# Patient Record
Sex: FEMALE | Race: WHITE | Employment: OTHER
[De-identification: names, ages, dates, MRNs, and addresses within clinical notes are randomized per-mention and may not be internally consistent; named-entity substitution may affect disease eponyms.]

---

## 2022-03-19 PROBLEM — K62.5 RECTAL BLEEDING: Status: ACTIVE | Noted: 2018-01-13

## 2022-03-19 PROBLEM — K52.9 COLITIS: Status: ACTIVE | Noted: 2018-01-13

## 2022-03-20 PROBLEM — N13.9 OBSTRUCTIVE UROPATHY: Status: ACTIVE | Noted: 2020-03-17

## 2023-02-01 RX ORDER — CALCIUM CARBONATE/VITAMIN D3 600 MG-10
1 TABLET ORAL DAILY
COMMUNITY

## 2023-02-01 RX ORDER — BACLOFEN 10 MG/1
10 TABLET ORAL 3 TIMES DAILY
COMMUNITY

## 2023-02-01 RX ORDER — BUPROPION HYDROCHLORIDE 300 MG/1
300 TABLET ORAL
COMMUNITY

## 2023-02-01 RX ORDER — MODAFINIL 200 MG/1
TABLET ORAL
COMMUNITY
Start: 2021-06-25

## 2023-02-01 RX ORDER — GABAPENTIN 300 MG/1
CAPSULE ORAL
COMMUNITY
Start: 2021-05-18

## 2023-02-01 RX ORDER — AZELASTINE HCL 205.5 UG/1
2 SPRAY NASAL 2 TIMES DAILY
COMMUNITY

## 2023-02-01 RX ORDER — ALBUTEROL SULFATE 90 UG/1
2 AEROSOL, METERED RESPIRATORY (INHALATION) EVERY 6 HOURS PRN
COMMUNITY

## 2023-02-01 RX ORDER — GABAPENTIN 800 MG/1
TABLET ORAL
COMMUNITY
Start: 2021-05-18

## 2023-02-01 RX ORDER — TRAZODONE HYDROCHLORIDE 100 MG/1
100 TABLET ORAL
COMMUNITY

## 2023-02-01 RX ORDER — FLUTICASONE PROPIONATE 50 MCG
2 SPRAY, SUSPENSION (ML) NASAL DAILY
COMMUNITY

## 2023-02-01 RX ORDER — DICYCLOMINE HCL 20 MG
20 TABLET ORAL 3 TIMES DAILY
COMMUNITY

## 2023-02-01 RX ORDER — BUTALBITAL, ACETAMINOPHEN AND CAFFEINE 50; 325; 40 MG/1; MG/1; MG/1
1 TABLET ORAL
COMMUNITY

## 2023-02-01 RX ORDER — TOPIRAMATE 100 MG/1
300 TABLET, FILM COATED ORAL
COMMUNITY

## 2023-05-07 PROBLEM — K52.9 ACUTE COLITIS: Status: ACTIVE | Noted: 2023-05-07

## 2023-05-11 PROBLEM — I95.9 HYPOTENSION: Status: ACTIVE | Noted: 2023-05-11

## 2023-05-11 PROBLEM — N13.9 OBSTRUCTIVE UROPATHY: Status: RESOLVED | Noted: 2020-03-17 | Resolved: 2023-05-11

## 2024-06-24 ENCOUNTER — HOSPITAL ENCOUNTER (OUTPATIENT)
Facility: HOSPITAL | Age: 54
Setting detail: RECURRING SERIES
Discharge: HOME | End: 2024-06-27
Payer: MEDICARE

## 2024-06-24 PROCEDURE — 97163 PT EVAL HIGH COMPLEX 45 MIN: CPT | Performed by: GENERAL ACUTE CARE HOSPITAL

## 2024-07-02 ENCOUNTER — HOSPITAL ENCOUNTER (OUTPATIENT)
Facility: HOSPITAL | Age: 54
Setting detail: RECURRING SERIES
Discharge: HOME | End: 2024-07-05
Payer: MEDICARE

## 2024-07-02 PROCEDURE — 97110 THERAPEUTIC EXERCISES: CPT | Performed by: GENERAL ACUTE CARE HOSPITAL

## 2024-07-02 PROCEDURE — 97530 THERAPEUTIC ACTIVITIES: CPT | Performed by: GENERAL ACUTE CARE HOSPITAL

## 2024-07-02 NOTE — PROGRESS NOTES
applicable)     Details if applicable:     40 40 [facility] Totals Reminder: bill using total billable min of TIMED therapeutic procedures (example: do not include dry needle or estim unattended, both untimed codes, in totals to left)  8-22 min = 1 unit; 23-37 min = 2 units; 38-52 min = 3 units; 53-67 min = 4 units; 68-82 min = 5 units   Total Total     [x]  Patient Education billed concurrently with other procedures   [x] Review HEP    [] Progressed/Changed HEP, detail:    [] Other detail:       Objective Information/Functional Measures/Assessment:  First F/U since IE; initiated per POC  She reports compliance with initial HEP   Following standing HR/TR and marches, patient required seated rest break d/t muscle fatigue.   Patient reported increased challenge with seated full cans secondary to right shoulder pain   Progress as tolerated NV      Patient will continue to benefit from skilled PT / OT services to modify and progress therapeutic interventions, analyze and address functional mobility deficits, analyze and address ROM deficits, analyze and address strength deficits, analyze and address soft tissue restrictions, analyze and cue for proper movement patterns, and analyze and modify for postural abnormalities to address functional deficits and attain remaining goals.    Progress toward goals / Updated goals:  []  See Progress Note/Recertification    Short term goals to be accomplished in 10 visits:   The pt will be IND and compliant with HEP and self management of symptoms.    Status at eval: developed and reviewed  Current:  reports compliance 07/02/2024  The patient will perform 5xSTS test in 18 seconds as an indicator of improved functional mobility.    Status at eval: 21 seconds with pain increase   Current:    The patient will improve right shoulder flexion AROM to 150 deg and B/L shoulder ABD AROM to 120 deg for improved OH activity tolerance and ADL's.   Status at eval: right shoulder flexion 140 deg, ABD 90

## 2024-07-08 ENCOUNTER — HOSPITAL ENCOUNTER (OUTPATIENT)
Facility: HOSPITAL | Age: 54
Setting detail: RECURRING SERIES
Discharge: HOME | End: 2024-07-11
Payer: MEDICARE

## 2024-07-08 PROCEDURE — 97110 THERAPEUTIC EXERCISES: CPT

## 2024-07-08 PROCEDURE — 97530 THERAPEUTIC ACTIVITIES: CPT

## 2024-07-08 NOTE — PROGRESS NOTES
taken and listed above the edema is considered significant and having an impact on the patient's ADL's     Therapeutic Procedures:  Tx Min Billable or 1:1 Min (if diff from Tx Min) Procedure, Rationale, Specifics    23 [id number] Therapeutic Exercise (timed):  increase ROM, strength, coordination, balance, and proprioception to improve patient's ability to progress to PLOF and address remaining functional goals. (see flow sheet as applicable)     Details if applicable:    BIKE   INCLINE STRETCH  HAMSTRING STRETCH  Ball and band  LAQ      15 [id number] Therapeutic Activity (timed):  use of dynamic activities replicating functional movements to increase ROM, strength, coordination, balance, and proprioception in order to improve patient's ability to progress to PLOF and address remaining functional goals.  (see flow sheet as applicable)     Details if applicable:    STANDING MARCH HR/TR  Seated full cans  Seated bicep curls       - 72050 Neuromuscular Re-Education (timed):  improve balance, coordination, kinesthetic sense, posture, core stability and proprioception to improve patient's ability to develop conscious control of individual muscles and awareness of position of extremities in order to progress to PLOF and address remaining functional goals. (see flow sheet as applicable)     Details if applicable:      - 29959 Manual Therapy (timed):  decrease pain and increase ROM to improve patient's ability to progress to PLOF and address remaining functional goals.  The manual therapy interventions were performed at a separate and distinct time from the therapeutic activities interventions . (see flow sheet as applicable)     Details if applicable:      - 81450 Self Care/Home Management (timed):  improve patient knowledge and understanding of pain reducing techniques, positioning, activity modification, diagnosis/prognosis, and physical therapy expectations, procedures and progression  to improve patient's ability to progress to

## 2024-07-10 ENCOUNTER — HOSPITAL ENCOUNTER (OUTPATIENT)
Facility: HOSPITAL | Age: 54
Setting detail: RECURRING SERIES
Discharge: HOME | End: 2024-07-13
Payer: MEDICARE

## 2024-07-10 PROCEDURE — 97530 THERAPEUTIC ACTIVITIES: CPT | Performed by: GENERAL ACUTE CARE HOSPITAL

## 2024-07-10 PROCEDURE — 97110 THERAPEUTIC EXERCISES: CPT | Performed by: GENERAL ACUTE CARE HOSPITAL

## 2024-07-10 NOTE — PROGRESS NOTES
PHYSICAL / OCCUPATIONAL THERAPY - DAILY TREATMENT NOTE (updated )    Patient Name: Adina Massey    Date: 7/10/2024    : 1970  Insurance: Payor: [employer] / [facility] / Product Type: *No Product type* /      Patient  verified yes     Visit #   Current / Total 4 24   Time   In / Out 242 334    Total Treatment Time 52    Pain   In / Out 3/10  3/10    Subjective Functional Status/Changes: Patient states she is feeling okay today.      NEXT PROGRESS NOTE DUE: 2024    TREATMENT AREA =  Muscle weakness (generalized) [M62.81]    OBJECTIVE    Modalities Rationale:     decrease inflammation and decrease pain to improve patient's ability to progress to PLOF and address remaining functional goals.     min [] Estim Unattended, type/location:                                      []  w/ice    []  w/heat    min [] Estim Attended, type/location:                                     []  w/US     []  w/ice    []  w/heat    []  TENS insruct      min []  Mechanical Traction: type/lbs                   []  pro   []  sup   []  int   []  cont    []  before manual    []  after manual    min []  Ultrasound, settings/location:      min []  Iontophoresis w/ dexamethasone, location:                                               []  take home patch       []  in clinic   10 min  unbilled [x]  Ice     []  Heat    location/position: Seated to C/S     min []  Paraffin,  details:     min []  Vasopneumatic Device, press/temp:     min []  Whirlpool / Fluido:    If using vaso (only need to measure limb vaso being performed on)      pre-treatment girth :       post-treatment girth :       measured at (landmark location) :      min []  Other:    Skin assessment post-treatment (if applicable):    []  intact    []  redness- no adverse reaction                 []redness - adverse reaction:      Vasopnuematic compression justification:  Per bilateral girth measures taken and listed above the edema is

## 2024-07-16 ENCOUNTER — HOSPITAL ENCOUNTER (OUTPATIENT)
Facility: HOSPITAL | Age: 54
Setting detail: RECURRING SERIES
Discharge: HOME | End: 2024-07-19
Payer: MEDICARE

## 2024-07-16 PROCEDURE — 97530 THERAPEUTIC ACTIVITIES: CPT | Performed by: GENERAL ACUTE CARE HOSPITAL

## 2024-07-16 PROCEDURE — 97110 THERAPEUTIC EXERCISES: CPT | Performed by: GENERAL ACUTE CARE HOSPITAL

## 2024-07-16 NOTE — PROGRESS NOTES
PHYSICAL / OCCUPATIONAL THERAPY - DAILY TREATMENT NOTE (updated )    Patient Name: Adina Massey    Date: 2024    : 1970  Insurance: Payor: [employer] / [facility] / Product Type: *No Product type* /      Patient  verified yes     Visit #   Current / Total 5 24   Time   In / Out 11:11 1155    Total Treatment Time 44    Pain   In / Out 3/10  3/10    Subjective Functional Status/Changes: Patient reports cardiologist is prescribing beta blocker to address HR spikes due to POTS.      NEXT PROGRESS NOTE DUE: 2024    TREATMENT AREA =  Muscle weakness (generalized) [M62.81]    OBJECTIVE    Modalities Rationale:     decrease inflammation and decrease pain to improve patient's ability to progress to PLOF and address remaining functional goals.     min [] Estim Unattended, type/location:                                      []  w/ice    []  w/heat    min [] Estim Attended, type/location:                                     []  w/US     []  w/ice    []  w/heat    []  TENS insruct      min []  Mechanical Traction: type/lbs                   []  pro   []  sup   []  int   []  cont    []  before manual    []  after manual    min []  Ultrasound, settings/location:      min []  Iontophoresis w/ dexamethasone, location:                                               []  take home patch       []  in clinic   PD  min  unbilled [x]  Ice     []  Heat    location/position: Seated to C/S     min []  Paraffin,  details:     min []  Vasopneumatic Device, press/temp:     min []  Whirlpool / Fluido:    If using vaso (only need to measure limb vaso being performed on)      pre-treatment girth :       post-treatment girth :       measured at (landmark location) :      min []  Other:    Skin assessment post-treatment (if applicable):    []  intact    []  redness- no adverse reaction                 []redness - adverse reaction:      Vasopnuematic compression justification:  Per bilateral girth

## 2024-07-18 ENCOUNTER — HOSPITAL ENCOUNTER (OUTPATIENT)
Facility: HOSPITAL | Age: 54
Setting detail: RECURRING SERIES
Discharge: HOME | End: 2024-07-21
Payer: MEDICARE

## 2024-07-18 PROCEDURE — 97110 THERAPEUTIC EXERCISES: CPT | Performed by: GENERAL ACUTE CARE HOSPITAL

## 2024-07-18 PROCEDURE — 97530 THERAPEUTIC ACTIVITIES: CPT | Performed by: GENERAL ACUTE CARE HOSPITAL

## 2024-07-18 NOTE — PROGRESS NOTES
PHYSICAL / OCCUPATIONAL THERAPY - DAILY TREATMENT NOTE (updated )    Patient Name: Adina Massey    Date: 2024    : 1970  Insurance: Payor: [employer]ICARE / [facility] / Product Type: *No Product type* /      Patient  verified yes     Visit #   Current / Total 6 24   Time   In / Out 1220 101    Total Treatment Time 41    Pain   In / Out 5/10 4/10    Subjective Functional Status/Changes: Patient states she is a little more achy today, not sure why. She reports performing HEP yesterday and had no questions regarding new exercises.       NEXT PROGRESS NOTE DUE: 2024    TREATMENT AREA =  Muscle weakness (generalized) [M62.81]    OBJECTIVE    Modalities Rationale:     decrease inflammation and decrease pain to improve patient's ability to progress to PLOF and address remaining functional goals.     min [] Estim Unattended, type/location:                                      []  w/ice    []  w/heat    min [] Estim Attended, type/location:                                     []  w/US     []  w/ice    []  w/heat    []  TENS insruct      min []  Mechanical Traction: type/lbs                   []  pro   []  sup   []  int   []  cont    []  before manual    []  after manual    min []  Ultrasound, settings/location:      min []  Iontophoresis w/ dexamethasone, location:                                               []  take home patch       []  in clinic   PD  min  unbilled [x]  Ice     []  Heat    location/position: Seated to C/S     min []  Paraffin,  details:     min []  Vasopneumatic Device, press/temp:     min []  Whirlpool / Fluido:    If using vaso (only need to measure limb vaso being performed on)      pre-treatment girth :       post-treatment girth :       measured at (landmark location) :      min []  Other:    Skin assessment post-treatment (if applicable):    []  intact    []  redness- no adverse reaction                 []redness - adverse reaction:    
Negative

## 2024-07-23 ENCOUNTER — HOSPITAL ENCOUNTER (OUTPATIENT)
Facility: HOSPITAL | Age: 54
Setting detail: RECURRING SERIES
Discharge: HOME | End: 2024-07-26
Payer: MEDICARE

## 2024-07-23 PROCEDURE — 97110 THERAPEUTIC EXERCISES: CPT | Performed by: GENERAL ACUTE CARE HOSPITAL

## 2024-07-23 PROCEDURE — 97530 THERAPEUTIC ACTIVITIES: CPT | Performed by: GENERAL ACUTE CARE HOSPITAL

## 2024-07-23 NOTE — PROGRESS NOTES
mobility      PROGRESS TOWARDS GOALS:  Short term goals to be accomplished in 10 visits:   The pt will be IND and compliant with HEP and self management of symptoms.    Status at eval: developed and reviewed  Current:  reports compliance 07/02/2024, compliant 07/23/2024  The patient will perform 5xSTS test in 18 seconds as an indicator of improved functional mobility.    Status at eval: 21 seconds with pain increase   Current:  22 seconds after exercises. 7/8/2024, remains 22 seconds 07/16/2024, NEARLY MET 19 seconds 07/23/2024  The patient will improve right shoulder flexion AROM to 150 deg and B/L shoulder ABD AROM to 120 deg for improved OH activity tolerance and ADL's.   Status at eval: right shoulder flexion 140 deg, ABD 90 deg; left shoulder  deg with UT compensation   Current:  right flexion 120 deg and abd 80 deg 07/10/2024,  flexion 135 deg,  deg 07/23/2024  The patient will improve B/L shoulder flexion and abduction strength to 4/5 for improved tolerance of household duties.    Status at eval: right shoulder flexion 4-/5 abduction 3/5; left shoulder flexion/abduction 4-/5  Current:  progressing, B/L flex 4/5 and ABD 4-/5 07/18/2024, flex 4/5 p! ABD 4-/5 p! 07/23/2024  The patient will improve B/L hip  flexion strength to 4/5 for improved walking tolerance.   Status at eval: right 3/5, left 4-/5  Current:  right 4+/5, left 4+/5  07/23/2024    Long term goals to be accomplished in 24 visits:   The patient will perform 5xSTS test in 14 seconds as an indicator of improved functional mobility.   Status at eval: 21 seconds with pain increase   Status at PN (07/23/2024): 19 seconds   Current:    The patient will improve right shoulder flexion AROM to 160 deg and B/L shoulder ABD AROM to 130 deg for improved OH activity tolerance and ADL's.   Status at eval: right shoulder flexion 140 deg, ABD 90 deg; left shoulder  deg with UT compensation  Status at PN (07/23/2024):  flexion 135 deg,  
will continue to benefit from skilled PT / OT services to modify and progress therapeutic interventions, analyze and address functional mobility deficits, analyze and address ROM deficits, analyze and address strength deficits, analyze and address soft tissue restrictions, analyze and cue for proper movement patterns, and analyze and modify for postural abnormalities to address functional deficits and attain remaining goals.    Progress toward goals / Updated goals:  []  See Progress Note/Recertification    Short term goals to be accomplished in 10 visits:   The pt will be IND and compliant with HEP and self management of symptoms.    Status at eval: developed and reviewed  Current:  reports compliance 07/02/2024, compliant 07/23/2024  The patient will perform 5xSTS test in 18 seconds as an indicator of improved functional mobility.    Status at eval: 21 seconds with pain increase   Current:  22 seconds after exercises. 7/8/2024, remains 22 seconds 07/16/2024, NEARLY MET 19 seconds 07/23/2024  The patient will improve right shoulder flexion AROM to 150 deg and B/L shoulder ABD AROM to 120 deg for improved OH activity tolerance and ADL's.   Status at eval: right shoulder flexion 140 deg, ABD 90 deg; left shoulder  deg with UT compensation   Current:  right flexion 120 deg and abd 80 deg 07/10/2024,  flexion 135 deg,  deg 07/23/2024  The patient will improve B/L shoulder flexion and abduction strength to 4/5 for improved tolerance of household duties.    Status at eval: right shoulder flexion 4-/5 abduction 3/5; left shoulder flexion/abduction 4-/5  Current:  progressing, B/L flex 4/5 and ABD 4-/5 07/18/2024, flex 4/5 p! ABD 4-/5 p! 07/23/2024  The patient will improve B/L hip  flexion strength to 4/5 for improved walking tolerance.   Status at eval: right 3/5, left 4-/5  Current:  right 4+/5, left 4+/5  07/23/2024    PLAN  [x]  Continue plan of care  [x]  Upgrade activities as tolerated  []  Discharge

## 2024-07-25 ENCOUNTER — APPOINTMENT (OUTPATIENT)
Facility: HOSPITAL | Age: 54
End: 2024-07-25
Payer: MEDICARE

## 2024-07-30 ENCOUNTER — HOSPITAL ENCOUNTER (OUTPATIENT)
Facility: HOSPITAL | Age: 54
Setting detail: RECURRING SERIES
Discharge: HOME | End: 2024-08-02
Payer: MEDICARE

## 2024-07-30 PROCEDURE — 97530 THERAPEUTIC ACTIVITIES: CPT | Performed by: GENERAL ACUTE CARE HOSPITAL

## 2024-07-30 PROCEDURE — 97110 THERAPEUTIC EXERCISES: CPT | Performed by: GENERAL ACUTE CARE HOSPITAL

## 2024-07-30 NOTE — PROGRESS NOTES
PHYSICAL / OCCUPATIONAL THERAPY - DAILY TREATMENT NOTE (updated )    Patient Name: Adina Massey    Date: 2024    : 1970  Insurance: Payor: [employer]ICARE / [facility] / Product Type: *No Product type* /      Patient  verified yes     Visit #   Current / Total 8 24   Time   In / Out 941   1031    Total Treatment Time 40    Pain   In / Out 5  2    Subjective Functional Status/Changes: Patient states her back is bothering her today.      NEXT PROGRESS NOTE DUE: 2024    TREATMENT AREA =  Muscle weakness (generalized) [M62.81]    OBJECTIVE    Modalities Rationale:     decrease inflammation and decrease pain to improve patient's ability to progress to PLOF and address remaining functional goals.     min [] Estim Unattended, type/location:                                      []  w/ice    []  w/heat    min [] Estim Attended, type/location:                                     []  w/US     []  w/ice    []  w/heat    []  TENS insruct      min []  Mechanical Traction: type/lbs                   []  pro   []  sup   []  int   []  cont    []  before manual    []  after manual    min []  Ultrasound, settings/location:      min []  Iontophoresis w/ dexamethasone, location:                                               []  take home patch       []  in clinic   10  min  unbilled []  Ice     [x]  Heat    location/position: Seated to L/S    min []  Paraffin,  details:     min []  Vasopneumatic Device, press/temp:     min []  Whirlpool / Fluido:    If using vaso (only need to measure limb vaso being performed on)      pre-treatment girth :       post-treatment girth :       measured at (landmark location) :      min []  Other:    Skin assessment post-treatment (if applicable):    []  intact    []  redness- no adverse reaction                 []redness - adverse reaction:      Vasopnuematic compression justification:  Per bilateral girth measures taken and listed above the edema is

## 2024-08-07 ENCOUNTER — HOSPITAL ENCOUNTER (OUTPATIENT)
Facility: HOSPITAL | Age: 54
Setting detail: RECURRING SERIES
Discharge: HOME | End: 2024-08-10
Payer: MEDICARE

## 2024-08-07 PROCEDURE — 97530 THERAPEUTIC ACTIVITIES: CPT | Performed by: GENERAL ACUTE CARE HOSPITAL

## 2024-08-07 PROCEDURE — 97110 THERAPEUTIC EXERCISES: CPT | Performed by: GENERAL ACUTE CARE HOSPITAL

## 2024-08-07 NOTE — PROGRESS NOTES
physical therapy expectations, procedures and progression  to improve patient's ability to progress to PLOF and address remaining functional goals.  (see flow sheet as applicable)     Details if applicable:     42      42  [facility] Totals Reminder: bill using total billable min of TIMED therapeutic procedures (example: do not include dry needle or estim unattended, both untimed codes, in totals to left)  8-22 min = 1 unit; 23-37 min = 2 units; 38-52 min = 3 units; 53-67 min = 4 units; 68-82 min = 5 units   Total Total     [x]  Patient Education billed concurrently with other procedures   [x] Review HEP    [] Progressed/Changed HEP, detail:    [] Other detail:       Objective Information/Functional Measures/Assessment:  Continued challenge with shoulder ABD > FLEXION   Seated rest break required following finger ladder exercise secondary to increased fatigue   Added TB rows/ext for improved parascapular strength   Right shoulder abduction strength improved to 4+/5.  Increased challenge with standing HS curls R>L.   Progress as tolerated.        Patient will continue to benefit from skilled PT / OT services to modify and progress therapeutic interventions, analyze and address functional mobility deficits, analyze and address ROM deficits, analyze and address strength deficits, analyze and address soft tissue restrictions, analyze and cue for proper movement patterns, and analyze and modify for postural abnormalities to address functional deficits and attain remaining goals.    Progress toward goals / Updated goals:  []  See Progress Note/Recertification  Long term goals to be accomplished in 17 visits:   The patient will perform 5xSTS test in 14 seconds as an indicator of improved functional mobility.   Status at eval: 21 seconds with pain increase   Status at PN (07/23/2024): 19 seconds   Current:    The patient will improve right shoulder flexion AROM to 160 deg and B/L shoulder ABD AROM to 130 deg for improved OH

## 2024-08-14 ENCOUNTER — APPOINTMENT (OUTPATIENT)
Facility: HOSPITAL | Age: 54
End: 2024-08-14
Payer: MEDICARE

## 2024-08-21 ENCOUNTER — HOSPITAL ENCOUNTER (OUTPATIENT)
Facility: HOSPITAL | Age: 54
Setting detail: RECURRING SERIES
Discharge: HOME | End: 2024-08-24
Payer: MEDICARE

## 2024-08-21 PROCEDURE — 97110 THERAPEUTIC EXERCISES: CPT | Performed by: GENERAL ACUTE CARE HOSPITAL

## 2024-08-21 PROCEDURE — 97530 THERAPEUTIC ACTIVITIES: CPT | Performed by: GENERAL ACUTE CARE HOSPITAL

## 2024-08-21 NOTE — PROGRESS NOTES
PHYSICAL / OCCUPATIONAL THERAPY - DAILY TREATMENT NOTE (updated )    Patient Name: Adina Massey    Date: 2024    : 1970  Insurance: Payor: [employer] / Plan: [facility] MEDICARE VALUE HMO / Product Type: *No Product type* /      Patient  verified yes     Visit #   Current / Total 10 24   Time   In / Out 1144 1225    Total Treatment Time 41    Pain   In / Out 2 2    Subjective Functional Status/Changes: % improvement: significant    Current improvements: good compliance with HEP, improved shoulder range of motion and strength   Remaining functional limitations: occasional days of increased pain    QuickDASH: 26%         NEXT PROGRESS NOTE DUE: 2024    TREATMENT AREA =  Muscle weakness (generalized) [M62.81]    OBJECTIVE    Modalities Rationale:     decrease inflammation and decrease pain to improve patient's ability to progress to PLOF and address remaining functional goals.     min [] Estim Unattended, type/location:                                      []  w/ice    []  w/heat    min [] Estim Attended, type/location:                                     []  w/US     []  w/ice    []  w/heat    []  TENS insruct      min []  Mechanical Traction: type/lbs                   []  pro   []  sup   []  int   []  cont    []  before manual    []  after manual    min []  Ultrasound, settings/location:      min []  Iontophoresis w/ dexamethasone, location:                                               []  take home patch       []  in clinic   PD   min  unbilled []  Ice     [x]  Heat    location/position: Seated to L/S    min []  Paraffin,  details:     min []  Vasopneumatic Device, press/temp:     min []  Whirlpool / Fluido:    If using vaso (only need to measure limb vaso being performed on)      pre-treatment girth :       post-treatment girth :       measured at (landmark location) :      min []  Other:    Skin assessment post-treatment (if applicable):    []  intact    []  redness- no adverse

## 2024-08-21 NOTE — PROGRESS NOTES
[facility]  [address] [place] [address]  Phone: [phone]   [phone]  PHYSICAL THERAPY PROGRESS NOTE  Patient Name: Adina Massey : 1970   Treatment/Medical Diagnosis: Muscle weakness (generalized) [M62.81]   Referral Source: Satinder Nguyen DO     Date of Initial Visit: 2024 Attended Visits: 10 Missed Visits: 1     SUMMARY OF TREATMENT  The pt has been seen for 10 visits to address generalized weakness, right shoulder pain. Therapeutic interventions have included therapeutic exercise, therapeutic activity, neuromuscular re-education, manual treatment, modalities and patient education.     CURRENT STATUS  % improvement: significant     Current improvements: good compliance with HEP, improved shoulder range of motion and strength   Remaining functional limitations: occasional days of increased pain     QuickDASH: 26%    Objective Information/Functional Measures/Assessment:  The patient presents for reassessment following 10 visits to address generalized weakness, right shoulder pain    improvement in functional mobility indicated by  QuickDASH score change of -22 points to 25%     The patient has maintained compliance with HEP and reports significant improvement in symptoms since starting PT   Held testing for 5xSTS test secondary to increased POTS symptoms today   Right shoulder AROM improved to 145 deg flexion and 120 deg abduction, left ABD AROM improved to 150 deg   Right shoulder strength improved to 5/5 flexion and 4+/5 abduction; left shoulder flexion and ABD 5/5   Secondary to progress towards therapy goals, the patient will be placed on a 30 day hold for a trial of IND management of symptoms with HEP. The patient will be reassessed in 30 days for formal discharge if appropriate, or continue PT 2x/week for remainder of 24 certified visits if continued skilled PT services are required.             PROGRESS

## 2025-04-26 NOTE — PROGRESS NOTES
PHYSICAL / OCCUPATIONAL THERAPY - DAILY TREATMENT NOTE (updated )  For Eval visit    Patient Name: Adina Massey    Date: 2024    : 1970  Ins[facility]: [facility] / [facility] / Product Type: *No Product type* /      Patient  verified yes     Visit #   Current / Total 1 10   Time   In / Out 201 241   Total Treatment  Time  40   Pain   In / Out 5 5   Subjective Functional Status/Changes: See below     NEXT PROGRESS NOTE DUE: 2024    TREATMENT AREA =  Muscle weakness (generalized) [M62.81]    SUBJECTIVE:  The patient presents with c/o general pain and weakness that progressed over the last month. Patient reports increased nausea limiting her activity tolerance and notes increased weakness. She reports some dizziness which she attributes to POTS symptoms. She does report occasional tripping, feels like her toes get caught. Patient states she underwent cortisone injection to upper thoracic spine in May 2024.          Functional Limitations:  All ADL's, difficulty performing yard duties, difficulty with stairs, pain with reaching overhead     Max pain: 8/10  Avg pain: 5/10  Min pain: 3/10    Objective Measure: QuickDASH    57%    Past Medical Hx:  Hx of fibromyalgia, POTS    Co-morbidities: asthma, depression, fibromyalgia, heart disease/high chol.      OBJECTIVE    40 min   Eval - untimed                        [x]  Patient Education billed concurrently with other procedures   [x] Review HEP    [] Progressed/Changed HEP, detail:    [] Other detail:       Objective Information/Functional Measures/Assessment:      Fall Risk Assessment: Does the patient have a fear of falling? yes If yes, what fall risk assessment was performed?    Fall assessment:    5xSTS test: 21 seconds with pain increase     C/S screen: WNL except right rotation 50 deg and left 70 deg     Right shoulder AROM: flexion 140 deg, ABD 90 deg   Right shoulder strength: flexion 4-/5, abduction 
RANJIT GORDILLO [facility]  [address], [place], [address] Phone: [phone] Fax [phone]  Plan of Care / Statement of Necessity for Physical Therapy Services     Patient Name: Adina Massey : 1970   Medical   Diagnosis: Muscle weakness (generalized) [M62.81] Treatment Diagnosis: M62.81  GENERAL MUSCLE WEAKNESS     Onset Date: May 2024  Payor   [facility] / Plan: [place] [employer] VALUE H[employer] / Product Type: *No Product type* /    Referral Source: Satinder Nguyen DO Start of Care (SOC): 2024   Prior Hospitalization: See medical history Provider #: [id number]   Prior Level of Function: IND   Comorbidities: asthma, depression, fibromyalgia, heart disease/high chol.     Assessment / key information:    SUBJECTIVE:  The patient presents with c/o general pain and weakness that progressed over the last month. Patient reports increased nausea limiting her activity tolerance and notes increased weakness. She reports some dizziness which she attributes to POTS symptoms. She does report occasional tripping, feels like her toes get caught. Patient states she underwent cortisone injection to upper thoracic spine in May 2024.           Functional Limitations:  All ADL's, difficulty performing yard duties, difficulty with stairs, pain with reaching overhead      Max pain: 8/10  Avg pain: 5/10  Min pain: 3/10     Objective Measure: QuickDASH    57%     Past Medical Hx:  Hx of fibromyalgia, POTS     Co-morbidities: asthma, depression, fibromyalgia, heart disease/high chol.    Objective Information/Functional Measures/Assessment:        Fall Risk Assessment: Does the patient have a fear of falling? yes If yes, what fall risk assessment was performed?    Fall assessment:               5xSTS test: 21 seconds with pain increase      C/S screen: WNL except right rotation 50 deg and left 70 deg      Right shoulder AROM: flexion 140 deg, ABD 90 deg 
Statement Selected